# Patient Record
Sex: FEMALE | Race: AMERICAN INDIAN OR ALASKA NATIVE
[De-identification: names, ages, dates, MRNs, and addresses within clinical notes are randomized per-mention and may not be internally consistent; named-entity substitution may affect disease eponyms.]

---

## 2017-08-30 ENCOUNTER — HOSPITAL ENCOUNTER (EMERGENCY)
Dept: HOSPITAL 5 - ED | Age: 47
Discharge: HOME | End: 2017-08-30
Payer: COMMERCIAL

## 2017-08-30 VITALS — DIASTOLIC BLOOD PRESSURE: 79 MMHG | SYSTOLIC BLOOD PRESSURE: 145 MMHG

## 2017-08-30 DIAGNOSIS — Z88.1: ICD-10-CM

## 2017-08-30 DIAGNOSIS — N76.0: Primary | ICD-10-CM

## 2017-08-30 LAB
BILIRUB UR QL STRIP: (no result)
BLOOD UR QL VISUAL: (no result)
KETONES UR STRIP-MCNC: (no result) MG/DL
LEUKOCYTE ESTERASE UR QL STRIP: (no result)
MUCOUS THREADS #/AREA URNS HPF: (no result) /HPF
NITRITE UR QL STRIP: (no result)
PH UR STRIP: 5 [PH] (ref 5–7)
PROT UR STRIP-MCNC: (no result) MG/DL
RBC #/AREA URNS HPF: 11 /HPF (ref 0–6)
UROBILINOGEN UR-MCNC: 2 MG/DL (ref ?–2)
WBC #/AREA URNS HPF: 2 /HPF (ref 0–6)

## 2017-08-30 PROCEDURE — 76856 US EXAM PELVIC COMPLETE: CPT

## 2017-08-30 PROCEDURE — 87210 SMEAR WET MOUNT SALINE/INK: CPT

## 2017-08-30 PROCEDURE — 87591 N.GONORRHOEAE DNA AMP PROB: CPT

## 2017-08-30 PROCEDURE — 81001 URINALYSIS AUTO W/SCOPE: CPT

## 2017-08-30 PROCEDURE — 99284 EMERGENCY DEPT VISIT MOD MDM: CPT

## 2017-08-30 PROCEDURE — 76830 TRANSVAGINAL US NON-OB: CPT

## 2017-08-30 PROCEDURE — 81025 URINE PREGNANCY TEST: CPT

## 2017-08-30 NOTE — ULTRASOUND REPORT
FINAL REPORT



EXAM:  US PELVIC COMPLETE



HISTORY:  ? IUD position 



TECHNIQUE:  Real-time sonography was performed of the pelvis

transabdominally and endovaginally. Images are submitted for

interpretation.



PRIORS:  None.



FINDINGS:  

There is an IUD in place in the lower uterine segment. There are

cervical nabothian cysts. Otherwise, the uterus appears normal

measuring 7.9 x 3.5 x 4.8 cm. The endometrial stripe measures 5

mm in thickness. 



Ovaries were not seen. 



There is no free pelvic fluid.



IMPRESSION:  

IUD in the lower uterine segment.

## 2017-08-30 NOTE — ULTRASOUND REPORT
FINAL REPORT



EXAM:  US TRANSVAGINAL



HISTORY:  could not see IUD string, pt states she has IUD 



TECHNIQUE:  Real-time sonography was performed of the pelvis

transabdominally and endovaginally. Images are submitted for

interpretation.



PRIORS:  None.



FINDINGS:  

There is an IUD in place in the lower uterine segment. There are

cervical nabothian cysts. Otherwise, the uterus appears normal

measuring 7.9 x 3.5 x 4.8 cm. The endometrial stripe measures 5

mm in thickness. 



Ovaries were not seen. 



There is no free pelvic fluid.



IMPRESSION:  

IUD in the lower uterine segment.

## 2017-08-30 NOTE — EMERGENCY DEPARTMENT REPORT
ED Female  HPI





- General


Chief complaint: Urogenital-Female


Stated complaint: VAG DISCHARGE/ITCHING


Time Seen by Provider: 08/30/17 20:27


Source: patient


Mode of arrival: Ambulatory


Limitations: No Limitations





- History of Present Illness


Initial comments: 


47-year-old female past medical history recurrent BV presents with complaint of 

increased vaginal discharge.  Denies any bleeding denies any abdominal pain 

states she has had BV 6 times within the last year.  Denies any fever chills 

nausea or vomiting


MD Complaint: vaginal discharge


Onset/Timing: 3


-: days(s)


Radiation: non-radiating, LLQ


Improves with: none


Are you Pregnant Now?: No





- Related Data


 Previous Rx's











 Medication  Instructions  Recorded  Last Taken  Type


 


metroNIDAZOLE [Flagyl] 500 mg PO Q12HR #14 tab 01/27/17 Unknown Rx


 


metroNIDAZOLE [Flagyl TAB] 500 mg PO Q12HR #14 tab 08/30/17 Unknown Rx











 Allergies











Allergy/AdvReac Type Severity Reaction Status Date / Time


 


erythromycin base Allergy  Hives Verified 08/30/17 17:00














ED Review of Systems


ROS: 


Stated complaint: VAG DISCHARGE/ITCHING


Other details as noted in HPI





Constitutional: denies: chills, fever


Eyes: denies: eye pain, eye discharge, vision change


ENT: denies: ear pain, throat pain


Respiratory: denies: cough, shortness of breath, wheezing


Cardiovascular: denies: chest pain, palpitations


Endocrine: no symptoms reported


Gastrointestinal: denies: abdominal pain, nausea, diarrhea


Genitourinary: denies: urgency, dysuria, discharge


Musculoskeletal: denies: back pain, joint swelling, arthralgia


Skin: denies: rash, lesions


Neurological: denies: headache, weakness, paresthesias


Psychiatric: denies: anxiety, depression


Hematological/Lymphatic: denies: easy bleeding, easy bruising





ED Past Medical Hx





- Past Medical History


Previous Medical History?: No





- Surgical History


Additional Surgical History: C/S x3, Gastric bypass





- Social History


Smoking Status: Never Smoker


Substance Use Type: Alcohol





- Medications


Home Medications: 


 Home Medications











 Medication  Instructions  Recorded  Confirmed  Last Taken  Type


 


metroNIDAZOLE [Flagyl] 500 mg PO Q12HR #14 tab 01/27/17  Unknown Rx


 


metroNIDAZOLE [Flagyl TAB] 500 mg PO Q12HR #14 tab 08/30/17  Unknown Rx














ED Physical Exam





- General


Limitations: No Limitations


General appearance: alert, in no apparent distress





- Head


Head exam: Present: atraumatic, normocephalic





- Eye


Eye exam: Present: normal appearance, PERRL, EOMI





- ENT


ENT exam: Present: mucous membranes moist





- Neck


Neck exam: Present: normal inspection, full ROM





- Respiratory


Respiratory exam: Present: normal lung sounds bilaterally.  Absent: respiratory 

distress





- Cardiovascular


Cardiovascular Exam: Present: regular rate, normal rhythm.  Absent: systolic 

murmur, diastolic murmur, rubs, gallop





- GI/Abdominal


GI/Abdominal exam: Present: soft, normal bowel sounds





- 


External exam: Present: normal external exam


Speculum exam: Present: vaginal discharge (whitish thick vaginal discharge)


Bi-manual exam: Present: normal bi-manual exam





- Extremities Exam


Extremities exam: Present: normal inspection





- Back Exam


Back exam: Present: normal inspection, full ROM





- Neurological Exam


Neurological exam: Present: alert, oriented X3, CN II-XII intact, normal gait





- Psychiatric


Psychiatric exam: Present: normal affect, normal mood





- Skin


Skin exam: Present: warm, dry, intact, normal color.  Absent: rash





ED Course


 Vital Signs











  08/30/17





  17:00


 


Temperature 98.1 F


 


Pulse Rate 70


 


Respiratory 16





Rate 


 


Blood Pressure 138/87


 


O2 Sat by Pulse 100





Oximetry 














ED Medical Decision Making





- Medical Decision Making


A/P: Vaginal discharge possible BV


1-did not visualize IUD string on exam so ultrasound was performed, IUD in 

place as per ultrasound


2-empiric treatment with Flagyl


3-f/u with OB/GYN





Critical care attestation.: 


If time is entered above; I have spent that time in minutes in the direct care 

of this critically ill patient, excluding procedure time.








ED Disposition


Clinical Impression: 


 Bacterial vaginal infection





Disposition: DC-01 TO HOME OR SELFCARE


Is pt being admited?: No


Does the pt Need Aspirin: No


Condition: Stable


Instructions:  Bacterial Vaginosis (ED)


Prescriptions: 


metroNIDAZOLE [Flagyl TAB] 500 mg PO Q12HR #14 tab


Referrals: 


[facility] [Provider Group] - 3-5 Days


Forms:  STI Treatment and Prevention, Work/School Release Form(ED)


Time of Disposition: 22:58

## 2018-05-04 ENCOUNTER — HOSPITAL ENCOUNTER (EMERGENCY)
Dept: HOSPITAL 5 - ED | Age: 48
Discharge: HOME | End: 2018-05-04
Payer: COMMERCIAL

## 2018-05-04 VITALS — DIASTOLIC BLOOD PRESSURE: 75 MMHG | SYSTOLIC BLOOD PRESSURE: 126 MMHG

## 2018-05-04 DIAGNOSIS — Z88.1: ICD-10-CM

## 2018-05-04 DIAGNOSIS — N76.0: Primary | ICD-10-CM

## 2018-05-04 LAB
BILIRUB UR QL STRIP: (no result)
BLOOD UR QL VISUAL: (no result)
MUCOUS THREADS #/AREA URNS HPF: (no result) /HPF
PH UR STRIP: 6 [PH] (ref 5–7)
PROT UR STRIP-MCNC: (no result) MG/DL
RBC #/AREA URNS HPF: 1 /HPF (ref 0–6)
UROBILINOGEN UR-MCNC: < 2 MG/DL (ref ?–2)
WBC #/AREA URNS HPF: 1 /HPF (ref 0–6)

## 2018-05-04 PROCEDURE — 96372 THER/PROPH/DIAG INJ SC/IM: CPT

## 2018-05-04 PROCEDURE — 87591 N.GONORRHOEAE DNA AMP PROB: CPT

## 2018-05-04 PROCEDURE — 99284 EMERGENCY DEPT VISIT MOD MDM: CPT

## 2018-05-04 PROCEDURE — 87210 SMEAR WET MOUNT SALINE/INK: CPT

## 2018-05-04 PROCEDURE — 81001 URINALYSIS AUTO W/SCOPE: CPT

## 2018-05-04 PROCEDURE — 81025 URINE PREGNANCY TEST: CPT

## 2018-05-04 NOTE — EMERGENCY DEPARTMENT REPORT
Chief Complaint: Urogenital-Female


Stated Complaint: VAGINAL DISCHARGE


Time Seen by Provider: 05/04/18 12:18





- HPI


History of Present Illness: 





48-year-old  female presents to the emergency department with 

complaint of vaginal discharges been going on for the past 2 days.  The day 

before that she had sex and the condom broke, which she thinks happens because 

of her IUD.  She has a previous history of Trichomonas many years ago and has 

had bacterial vaginosis multiple times.  She tried treating herself with a 

metronidazole suppository.  She denies any fever, dysuria, vaginal bleeding or 

any pelvic pain.





- ROS


Review of Systems: 


Positive for vaginal discharge


Negative for vaginal bleeding, dysuria, fever, nausea, vomiting or pelvic pain








- Exam


Vital Signs: 


 Vital Signs











  05/04/18





  11:17


 


Temperature 99.1 F


 


Pulse Rate 68


 


Respiratory 18





Rate 


 


Blood Pressure 126/75


 


O2 Sat by Pulse 97





Oximetry 











Physical Exam: 


Heart and lungs sounds are normal to auscultation.  Patient is awake and alert 

and does not appear in any acute distress.





MSE screening note: 


Focused history and physical exam performed.


Due to findings the following was ordered:





She will have a urinalysis and urine pregnancy test.  She will have a wet prep 

and diarrhea/chlamydia with a pelvic examination.





ED Disposition for MSE


Condition: Stable


Referrals: 


[facility] [Primary Care Provider] - 3-5 Days

## 2018-05-04 NOTE — EMERGENCY DEPARTMENT REPORT
ED Female  HPI





- General


Chief complaint: Urogenital-Female


Stated complaint: VAGINAL DISCHARGE


Time Seen by Provider: 05/04/18 12:18


Source: patient


Mode of arrival: Ambulatory


Limitations: No Limitations





- History of Present Illness


Initial comments: 





This is a 48-year-old -American female who presents with vaginal 

discharge for 2 days.  Patient is concerned of possibly having a STD.  She had 

intercourse Tuesday night and the condom tear from IUD.  They were able to pull 

the entire condom out and noticed a whole in it.  She is denying pelvic and low 

back pain.  She had discomfort with urination a few times. She is aware IUD 

will need to be removed this year. She will followup with [facility]. She used Monistat for 2 days prior to coming in for evaluation and 

symptoms got worse.  


MD Complaint: vaginal discharge


-: days(s) (2 days)


Location: labia


Radiation: non-radiating


Severity: mild


Severity scale (0 -10): 0


Quality: other (itching and discomfort with urination)


Consistency: intermittent


Improves with: none


Worsens with: urination


Are you Pregnant Now?: No


Associated Symptoms: vaginal discharge.  denies: denies other symptoms, vaginal 

bleeding, abdominal pain, nausea/vomiting, fever/chills, headaches, loss of 

appetite, dysuria, hematuria, rash, seizure, shortness of breath, syncope, 

weakness





- Related Data


Sexually active: Yes


 Previous Rx's











 Medication  Instructions  Recorded  Last Taken  Type


 


metroNIDAZOLE [Flagyl] 500 mg PO Q12HR #14 tab 01/27/17 Unknown Rx


 


metroNIDAZOLE [Flagyl TAB] 500 mg PO Q12HR #14 tab 08/30/17 Unknown Rx


 


metroNIDAZOLE [Metronidazole] 500 mg PO BID 7 Days #14 tablet 05/04/18 Unknown 

Rx











 Allergies











Allergy/AdvReac Type Severity Reaction Status Date / Time


 


erythromycin base Allergy  Hives Verified 05/04/18 11:17














ED Review of Systems


ROS: 


Stated complaint: VAGINAL DISCHARGE


Other details as noted in HPI





Constitutional: denies: chills, fever


Respiratory: denies: cough, shortness of breath, wheezing


Cardiovascular: denies: chest pain, palpitations, edema, syncope


Gastrointestinal: denies: abdominal pain, nausea, vomiting, diarrhea


Genitourinary: discharge (white thick discharge).  denies: urgency, dysuria, 

frequency, hematuria


Neurological: denies: headache, weakness, paresthesias


Psychiatric: denies: anxiety, depression





ED Past Medical Hx





- Past Medical History


Previous Medical History?: No





- Surgical History


Additional Surgical History: C/S x3, Gastric bypass





- Social History


Smoking Status: Never Smoker


Substance Use Type: Alcohol





- Medications


Home Medications: 


 Home Medications











 Medication  Instructions  Recorded  Confirmed  Last Taken  Type


 


metroNIDAZOLE [Flagyl] 500 mg PO Q12HR #14 tab 01/27/17  Unknown Rx


 


metroNIDAZOLE [Flagyl TAB] 500 mg PO Q12HR #14 tab 08/30/17  Unknown Rx


 


metroNIDAZOLE [Metronidazole] 500 mg PO BID 7 Days #14 tablet 05/04/18  Unknown 

Rx














ED Physical Exam





- General


Limitations: No Limitations


General appearance: alert, in no apparent distress, obese





- Respiratory


Respiratory exam: Present: normal lung sounds bilaterally.  Absent: respiratory 

distress





- Cardiovascular


Cardiovascular Exam: Present: regular rate, normal rhythm, normal heart sounds.

  Absent: systolic murmur, diastolic murmur, rubs, gallop





- GI/Abdominal


GI/Abdominal exam: Present: soft, normal bowel sounds.  Absent: organomegaly, 

mass





- 


External exam: Present: normal external exam.  Absent: erythema, swelling, 

lesions, lacerations, ecchymosis, bleeding


Speculum exam: Present: vaginal discharge (thin white discharge, no odor).  

Absent: erythema, cervical discharge, vaginal bleeding, foreign body, tissue, 

laceration


Bi-manual exam: Present: normal bi-manual exam





- Neurological Exam


Neurological exam: Present: alert, oriented X3, normal gait





- Psychiatric


Psychiatric exam: Present: normal affect, normal mood





- Skin


Skin exam: Present: warm, dry, intact, normal color.  Absent: rash





ED Course


 Vital Signs











  05/04/18





  11:17


 


Temperature 99.1 F


 


Pulse Rate 68


 


Respiratory 18





Rate 


 


Blood Pressure 126/75


 


O2 Sat by Pulse 97





Oximetry 














ED Medical Decision Making





- Medical Decision Making





This is a 48 y.o. female presents with vaginal discharge and STD exposure for 2 

days. Patient was examined by me. Obtained wet prep via pelvic exam, results 

positive for bacterial vaginitis. Pending G/C culture. Discussed results with 

patient. Patient request to be empirically treated for STD. Given azithromycin 

1 g po and rocephin 250 mg IM once in ER. Discharged home in stable condition. 

Start metronidazole 500 mg po bid x 7 days. Discussed prevention options. F/U 

with PCP or Health Department.





Critical care attestation.: 


If time is entered above; I have spent that time in minutes in the direct care 

of this critically ill patient, excluding procedure time.








ED Disposition


Clinical Impression: 


 Exposure to STD, Bacterial vaginosis





Disposition: DC-01 TO HOME OR SELFCARE


Is pt being admited?: No


Does the pt Need Aspirin: No


Condition: Stable


Instructions:  Bacterial Vaginosis (ED)


Additional Instructions: 


Complete the full course of antibiotics as prescribed.


Avoid drinking alcohol while taking antibiotics and for 24 hours after 

completion of antibiotics.


Continue safe sexual intercourse.


Follow up with Primary Care Provider or health department for full screening.


Prescriptions: 


metroNIDAZOLE [Metronidazole] 500 mg PO BID 7 Days #14 tablet


Forms:  STI Treatment and Prevention


Time of Disposition: 14:32


Print Language: ENGLISH